# Patient Record
Sex: FEMALE | Race: WHITE | NOT HISPANIC OR LATINO | Employment: UNEMPLOYED | URBAN - NONMETROPOLITAN AREA
[De-identification: names, ages, dates, MRNs, and addresses within clinical notes are randomized per-mention and may not be internally consistent; named-entity substitution may affect disease eponyms.]

---
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
## 2022-01-14 ENCOUNTER — OFFICE VISIT (OUTPATIENT)
Dept: OBSTETRICS AND GYNECOLOGY | Facility: CLINIC | Age: 22
End: 2022-01-14

## 2022-01-14 VITALS
DIASTOLIC BLOOD PRESSURE: 60 MMHG | HEIGHT: 62 IN | WEIGHT: 115.8 LBS | SYSTOLIC BLOOD PRESSURE: 98 MMHG | HEART RATE: 91 BPM | BODY MASS INDEX: 21.31 KG/M2

## 2022-01-14 DIAGNOSIS — N89.8 LEUKORRHEA: Primary | ICD-10-CM

## 2022-01-14 DIAGNOSIS — N94.10 FEMALE DYSPAREUNIA: ICD-10-CM

## 2022-01-14 DIAGNOSIS — L98.9 SKIN LESION: ICD-10-CM

## 2022-01-14 PROCEDURE — 99214 OFFICE O/P EST MOD 30 MIN: CPT | Performed by: NURSE PRACTITIONER

## 2022-01-14 PROCEDURE — 17000 DESTRUCT PREMALG LESION: CPT | Performed by: NURSE PRACTITIONER

## 2022-01-14 PROCEDURE — 87210 SMEAR WET MOUNT SALINE/INK: CPT | Performed by: NURSE PRACTITIONER

## 2022-01-14 RX ORDER — ONDANSETRON 4 MG/1
4 TABLET, ORALLY DISINTEGRATING ORAL EVERY 8 HOURS PRN
Qty: 12 TABLET | Refills: 0 | Status: SHIPPED | OUTPATIENT
Start: 2022-01-14 | End: 2022-12-16

## 2022-01-14 RX ORDER — LEVOTHYROXINE SODIUM 0.03 MG/1
TABLET ORAL
COMMUNITY
Start: 2021-12-29 | End: 2022-12-16 | Stop reason: DRUGHIGH

## 2022-01-14 RX ORDER — DOXYCYCLINE 100 MG/1
100 CAPSULE ORAL 2 TIMES DAILY
Qty: 14 CAPSULE | Refills: 0 | Status: SHIPPED | OUTPATIENT
Start: 2022-01-14 | End: 2022-01-21

## 2022-01-14 RX ORDER — BUSPIRONE HYDROCHLORIDE 5 MG/1
TABLET ORAL
COMMUNITY
Start: 2021-12-20 | End: 2022-12-16

## 2022-01-14 RX ORDER — FLUCONAZOLE 150 MG/1
TABLET ORAL
Qty: 2 TABLET | Refills: 0 | Status: SHIPPED | OUTPATIENT
Start: 2022-01-14 | End: 2022-12-16

## 2022-01-14 NOTE — PROGRESS NOTES
Subjective   Una Dubois is a 21 y.o. female.     HPI   Pt presents with concerns about a possible skin tag or recurrent condyloma on the mons pubis. I have performed TCA on the area before with success. She noted this area a month ago but after shaving yesterday, notes it to be smaller today. Doesn't itch or burn. She also notes 2 smaller placed just adjacent to the previously treated area.     Pt also wanted to talk about pain with intercourse again. She previously mentioned some pelvic pain with sex but it had gotten better. Last visit she had leukorrhea and STI testing was negative. It appeared to be cervicitis. I gave her doxycycline in Oct. Pt completed that and said discharge and pain did improve significantly. Pain isn't as frequent or severe now. Burning is worse after sex too. Pt has chronic discharge but no odors or itching or burning (outside of intercourse related). Has still been with the same partner, who she states has never been with anyone else. Hasn't ever had a OneSwab.     The following portions of the patient's history were reviewed and updated as appropriate: allergies, current medications, past family history, past medical history, past social history, past surgical history and problem list.    Review of Systems   Constitutional: Negative.  Negative for chills and fever.   Respiratory: Negative.    Cardiovascular: Negative.    Genitourinary: Positive for dyspareunia and vaginal discharge (chronic). Negative for difficulty urinating, dysuria, flank pain, frequency, genital sores, menstrual problem, pelvic pain, urgency, vaginal bleeding and vaginal pain.   Skin: Positive for skin lesions (mons pubis).   Neurological: Negative for dizziness, syncope and light-headedness.       Objective   Physical Exam  Vitals reviewed. Exam conducted with a chaperone present.   Constitutional:       Appearance: Normal appearance.   Pulmonary:      Effort: Pulmonary effort is normal.   Genitourinary:      General: Normal vulva.      Labia:         Right: No rash, tenderness, lesion or injury.         Left: No rash, tenderness, lesion or injury.       Vagina: No signs of injury and foreign body. Vaginal discharge (copious pale yellow/white discharge, wet prep obtained ) and tenderness present. No erythema, bleeding, lesions or prolapsed vaginal walls.      Cervix: Friability and erythema present. No cervical motion tenderness, discharge, lesion, cervical bleeding or eversion.      Uterus: Normal. Tender: mild.       Adnexa: Right adnexa normal and left adnexa normal.        Right: No mass, tenderness or fullness.          Left: No mass, tenderness or fullness.            Comments: Mild scarring noted in the area of previous TCA application. There is a very pinpoint, barely raised, fleshy area where pt points out. No erythema, exudate, warmth, sore, etc noted. There is a slightly larger 3mm, fleshy pink skin tag noted in red above. Wet prep: positive for WBC TNTC, no clue cells, no yeast buds, hyphae or trichomonads. Evaluated by GERSON Ceron. Dyspareunia type pain was mildly reproduced on exam and burning present after exam again today.   Neurological:      Mental Status: She is alert and oriented to person, place, and time.   Psychiatric:         Mood and Affect: Mood normal.         Behavior: Behavior normal.           Assessment/Plan   Diagnoses and all orders for this visit:    1. Leukorrhea (Primary)  -     doxycycline (MONODOX) 100 MG capsule; Take 1 capsule by mouth 2 (Two) Times a Day for 7 days.  Dispense: 14 capsule; Refill: 0  -     fluconazole (DIFLUCAN) 150 MG tablet; Take 1 tablet PO on day 3 and day 7 of antibiotics  Dispense: 2 tablet; Refill: 0  -     ondansetron ODT (Zofran ODT) 4 MG disintegrating tablet; Place 1 tablet on the tongue Every 8 (Eight) Hours As Needed for Nausea or Vomiting.  Dispense: 12 tablet; Refill: 0    2. Female dyspareunia    3. Skin lesion         Discussed benign  findings on the skin. I would not recommend TCA and would monitor the areas on the mons pubis next to previous TCA treatment site. Pt really wants the larger one addressed. I explained it is benign appearance and extremely small. We can try TCA but it may not take to the area well. Pt voices understanding. I applied one application of TCA to the area. Pt tolerated well.  Pt encouraged not to shave to prevent skin irritation and skin tags.    Discussed findings of continued leukorrhea. She has had chronic discharge with WBC noted before and STI testing negative. I recommend a OneSwab to rule otu other potential infections. I will call with results and Rx PRN.     I recommend antibiotics in the menatime. Doxycycline 100mg BID x 7 days. Diflucan on day 3 and 7 to prevent secondary candida. Zofran to take PRN since she had some nausea on Doxycycline previously. No intercourse during treatment. Pt voices understanding and agreement with this plan.

## 2022-03-18 DIAGNOSIS — Z30.45 ENCOUNTER FOR SURVEILLANCE OF TRANSDERMAL PATCH HORMONAL CONTRACEPTIVE DEVICE: ICD-10-CM

## 2022-03-18 RX ORDER — NORELGESTROMIN AND ETHINLY ESTRADIOL 150; 35 UG/D; UG/D
PATCH TRANSDERMAL
Qty: 3 PATCH | Refills: 12 | Status: SHIPPED | OUTPATIENT
Start: 2022-03-18 | End: 2023-03-20

## 2022-04-27 ENCOUNTER — OFFICE VISIT (OUTPATIENT)
Dept: OBSTETRICS AND GYNECOLOGY | Facility: CLINIC | Age: 22
End: 2022-04-27

## 2022-04-27 VITALS
BODY MASS INDEX: 22.36 KG/M2 | HEIGHT: 61 IN | SYSTOLIC BLOOD PRESSURE: 92 MMHG | WEIGHT: 118.4 LBS | HEART RATE: 82 BPM | DIASTOLIC BLOOD PRESSURE: 62 MMHG

## 2022-04-27 DIAGNOSIS — A63.0 CONDYLOMA OF FEMALE GENITALIA: Primary | ICD-10-CM

## 2022-04-27 PROCEDURE — 17000 DESTRUCT PREMALG LESION: CPT | Performed by: NURSE PRACTITIONER

## 2022-04-27 PROCEDURE — 99213 OFFICE O/P EST LOW 20 MIN: CPT | Performed by: NURSE PRACTITIONER

## 2022-04-27 RX ORDER — CETIRIZINE HYDROCHLORIDE 10 MG/1
10 TABLET ORAL DAILY
COMMUNITY
Start: 2022-04-21 | End: 2022-12-16

## 2022-04-27 NOTE — PROGRESS NOTES
"Subjective   Una Dubois is a 21 y.o. female.     History of Present Illness   Pt presents with concerns of another \"skin tag\" or condyloma on the left buttock/inner labia majora. Present for a few months and increasing in size. She has had other areas treated with TCA before. Some appear like skin tags and others have appeared more as condyloma. Doesn't itch or hurt.     The following portions of the patient's history were reviewed and updated as appropriate: allergies, current medications, past family history, past medical history, past social history, past surgical history and problem list.    Review of Systems   Constitutional: Negative.    Genitourinary: Positive for genital sores (\"skin tag\" on left inner labia majora). Negative for vaginal pain.       Objective   Physical Exam  Vitals reviewed. Exam conducted with a chaperone present.   Constitutional:       Appearance: Normal appearance.   Genitourinary:     Labia:         Left: Lesion present.           Comments: TCA applied to the lesion. It responded appropriated. Pt tolerated well.   Neurological:      Mental Status: She is alert and oriented to person, place, and time.           Assessment/Plan   Diagnoses and all orders for this visit:    1. Condyloma of female genitalia (Primary)      Pt wished to move forward with TCA treatment today. Gave verbal consent.    Pt tolerated well.     Avoid scratching or scrubbing the area. Pat clean and dry. RTC if lesion persists 2 weeks after treatment.     Pt voices understanding and agreement with this plan of care.          "

## 2022-12-16 ENCOUNTER — OFFICE VISIT (OUTPATIENT)
Dept: OBSTETRICS AND GYNECOLOGY | Facility: CLINIC | Age: 22
End: 2022-12-16

## 2022-12-16 VITALS
HEART RATE: 81 BPM | SYSTOLIC BLOOD PRESSURE: 92 MMHG | HEIGHT: 62 IN | WEIGHT: 123.4 LBS | BODY MASS INDEX: 22.71 KG/M2 | DIASTOLIC BLOOD PRESSURE: 58 MMHG

## 2022-12-16 DIAGNOSIS — N89.8 VAGINAL LEUKORRHEA: ICD-10-CM

## 2022-12-16 DIAGNOSIS — R10.2 CHRONIC PELVIC PAIN IN FEMALE: ICD-10-CM

## 2022-12-16 DIAGNOSIS — G89.29 CHRONIC PELVIC PAIN IN FEMALE: ICD-10-CM

## 2022-12-16 DIAGNOSIS — A63.0 CONDYLOMA: ICD-10-CM

## 2022-12-16 DIAGNOSIS — N94.10 DYSPAREUNIA IN FEMALE: Primary | ICD-10-CM

## 2022-12-16 PROCEDURE — 87210 SMEAR WET MOUNT SALINE/INK: CPT | Performed by: NURSE PRACTITIONER

## 2022-12-16 PROCEDURE — 99214 OFFICE O/P EST MOD 30 MIN: CPT | Performed by: NURSE PRACTITIONER

## 2022-12-16 RX ORDER — DOXYCYCLINE 100 MG/1
100 CAPSULE ORAL 2 TIMES DAILY
Qty: 14 CAPSULE | Refills: 0 | Status: SHIPPED | OUTPATIENT
Start: 2022-12-16 | End: 2022-12-23

## 2022-12-16 RX ORDER — LEVOTHYROXINE SODIUM 0.07 MG/1
TABLET ORAL
COMMUNITY
Start: 2022-12-05

## 2022-12-16 RX ORDER — FLUCONAZOLE 150 MG/1
TABLET ORAL
Qty: 2 TABLET | Refills: 0 | Status: SHIPPED | OUTPATIENT
Start: 2022-12-16 | End: 2023-01-19

## 2022-12-16 RX ORDER — LEVETIRACETAM 1000 MG/1
TABLET ORAL
COMMUNITY
Start: 2022-11-01

## 2022-12-16 RX ORDER — FLUCONAZOLE 150 MG/1
TABLET ORAL
Qty: 2 TABLET | Refills: 0 | Status: SHIPPED | OUTPATIENT
Start: 2022-12-16 | End: 2022-12-16 | Stop reason: SDUPTHER

## 2022-12-16 RX ORDER — FOLIC ACID 1 MG/1
TABLET ORAL
COMMUNITY
Start: 2022-11-01

## 2022-12-16 RX ORDER — DOXYCYCLINE 100 MG/1
100 CAPSULE ORAL 2 TIMES DAILY
Qty: 14 CAPSULE | Refills: 0 | Status: SHIPPED | OUTPATIENT
Start: 2022-12-16 | End: 2022-12-16 | Stop reason: SDUPTHER

## 2022-12-19 NOTE — PROGRESS NOTES
"Subjective   Una Dubois is a 22 y.o. female.     HPI   Pt presents with concerns of another condyloma on the mons pubis area. Present for a few months and increasing in size. She has had other areas treated with TCA before. Some appear like skin tags and others have appeared more as condyloma. Doesn't itch or hurt.     Pt also continues to complain of dyspareunia. Persisting over the last few years but seems worse at certain times. Typically deep in the pelvis but recently noticed \"vaginal wall\" pain with sex. Same partner for a few years. Patient's last menstrual period was 12/02/2022 (approximate). Is on Xulane patches for contraceptive. She inquires about endometriosis. No family hx of it. But does have painful bowel movements, intercourse, and back pain. She has chronic vaginal discharge. OneSwabs have been negative, no change in partners or concerns for STI. Last treated with doxycycline 100mg BID x 7days in Jan 2022 for leukorrhea prior to negative OneSwab.     The following portions of the patient's history were reviewed and updated as appropriate: allergies, current medications, past family history, past medical history, past social history, past surgical history and problem list.    Review of Systems   Constitutional: Negative.  Negative for chills and fever.   Respiratory: Negative.    Cardiovascular: Positive for palpitations.        Being evaluated by cardio   Gastrointestinal:        Painful bowel movements   Endocrine:        Hypothyroidism   Genitourinary: Positive for dyspareunia, genital sores (condyloma on the mons pubis), pelvic pain (during and outside of her period) and vaginal discharge (chronic). Negative for difficulty urinating, dysuria, flank pain, frequency, hematuria, menstrual problem, pelvic pressure, urgency, vaginal bleeding and vaginal pain.   Musculoskeletal: Positive for back pain.   Neurological: Negative for dizziness, syncope and light-headedness.       Objective   Physical " Exam  Vitals reviewed. Exam conducted with a chaperone present.   Constitutional:       General: She is not in acute distress.     Appearance: Normal appearance. She is normal weight. She is not ill-appearing.   Pulmonary:      Effort: Pulmonary effort is normal.   Genitourinary:     Labia:         Right: No rash, tenderness, lesion or injury.         Left: No rash, tenderness, lesion or injury.       Vagina: No signs of injury and foreign body. Vaginal discharge (small amount of creamy/yellow discharge, wet prep obtained) present. No erythema, tenderness, bleeding or lesions.      Cervix: No cervical motion tenderness, discharge, friability, lesion, erythema or cervical bleeding.      Uterus: Normal. Not enlarged and not tender.       Adnexa: Right adnexa normal and left adnexa normal.        Right: No mass, tenderness or fullness.          Left: No mass, tenderness or fullness.            Comments: TCA applied to the lesion. It responded appropriated. Pt tolerated well. Mild ectopy of cervix. Wet prep: positive clue WBC TNTC, no clue cells, yeast buds, hyphae or trichomonads. Evaluated by GERSON Ceron.   Neurological:      Mental Status: She is alert and oriented to person, place, and time.   Psychiatric:         Mood and Affect: Mood normal.         Behavior: Behavior normal.           Assessment & Plan   Diagnoses and all orders for this visit:    1. Dyspareunia in female (Primary)  -     US Non-ob Transvaginal; Future    2. Chronic pelvic pain in female    3. Condyloma    4. Vaginal leukorrhea  -     doxycycline (MONODOX) 100 MG capsule; Take 1 capsule by mouth 2 (Two) Times a Day for 7 days.  Dispense: 14 capsule; Refill: 0  -     fluconazole (DIFLUCAN) 150 MG tablet; Take 1 tablet PO on day 3 and day 7 of antibiotics.  Dispense: 2 tablet; Refill: 0    Other orders  -     Discontinue: doxycycline (MONODOX) 100 MG capsule; Take 1 capsule by mouth 2 (Two) Times a Day for 7 days.  Dispense: 14 capsule;  Refill: 0  -     Discontinue: fluconazole (DIFLUCAN) 150 MG tablet; Take 1 tablet PO on day 3 and day 7 of antibiotics.  Dispense: 2 tablet; Refill: 0      Pt wished to move forward with TCA treatment today. Gave verbal consent.    Pt tolerated well.     Avoid scratching or scrubbing the area. Pat clean and dry. RTC if lesion persists 2 weeks after treatment.     Pt voices understanding and agreement with this plan of care.     I discussed her chronic pelvic pain and dyspareunia and discussed endometriosis diagnosis at length. Last pelvic US was 1.5 years ago. We will obtain TVUS for further assessment. I discussed that if it is negative, that doesn't rule out endometriosis. Pt voices understanding. We may can consider myfembree, if approved, but pt will need to use condoms consistently as birth control. Pt voices understanding. She does not want to have diagnostic lap unless absolutely necessary.     Discussed leukorrhea findings. They did previously improve with doxycycline 1 year ago. We will retreat and if persisting or recurring, I recommend repeating OneSwab again. Pt voices understanding.     I will call with TVUS results and discuss next steps.

## 2023-02-08 PROCEDURE — 87081 CULTURE SCREEN ONLY: CPT | Performed by: FAMILY MEDICINE

## 2023-02-08 PROCEDURE — 87636 SARSCOV2 & INF A&B AMP PRB: CPT | Performed by: FAMILY MEDICINE

## 2023-02-16 ENCOUNTER — HOSPITAL ENCOUNTER (OUTPATIENT)
Dept: ULTRASOUND IMAGING | Facility: HOSPITAL | Age: 23
Discharge: HOME | End: 2023-02-16
Admitting: NURSE PRACTITIONER
Payer: COMMERCIAL

## 2023-02-16 DIAGNOSIS — N94.10 DYSPAREUNIA IN FEMALE: ICD-10-CM

## 2023-02-16 PROCEDURE — 76830 TRANSVAGINAL US NON-OB: CPT

## 2023-03-16 ENCOUNTER — OFFICE VISIT (OUTPATIENT)
Dept: INTERNAL MEDICINE | Facility: CLINIC | Age: 23
End: 2023-03-16
Payer: COMMERCIAL

## 2023-03-16 VITALS
BODY MASS INDEX: 26.61 KG/M2 | HEART RATE: 109 BPM | OXYGEN SATURATION: 99 % | WEIGHT: 132 LBS | HEIGHT: 59 IN | DIASTOLIC BLOOD PRESSURE: 76 MMHG | TEMPERATURE: 97.1 F | SYSTOLIC BLOOD PRESSURE: 124 MMHG

## 2023-03-16 DIAGNOSIS — E55.9 VITAMIN D DEFICIENCY: Primary | ICD-10-CM

## 2023-03-16 DIAGNOSIS — R53.83 FATIGUE, UNSPECIFIED TYPE: ICD-10-CM

## 2023-03-16 DIAGNOSIS — G40.909 NONINTRACTABLE EPILEPSY WITHOUT STATUS EPILEPTICUS, UNSPECIFIED EPILEPSY TYPE: ICD-10-CM

## 2023-03-16 DIAGNOSIS — E03.9 ACQUIRED HYPOTHYROIDISM: ICD-10-CM

## 2023-03-16 DIAGNOSIS — E53.8 FOLATE DEFICIENCY: ICD-10-CM

## 2023-03-16 DIAGNOSIS — Z00.00 WELLNESS EXAMINATION: ICD-10-CM

## 2023-03-16 PROCEDURE — 99214 OFFICE O/P EST MOD 30 MIN: CPT | Performed by: NURSE PRACTITIONER

## 2023-03-16 PROCEDURE — 1159F MED LIST DOCD IN RCRD: CPT | Performed by: NURSE PRACTITIONER

## 2023-03-16 PROCEDURE — 1160F RVW MEDS BY RX/DR IN RCRD: CPT | Performed by: NURSE PRACTITIONER

## 2023-03-16 RX ORDER — METOPROLOL SUCCINATE 25 MG/1
25 TABLET, EXTENDED RELEASE ORAL DAILY
Qty: 30 TABLET | Refills: 11 | Status: SHIPPED | OUTPATIENT
Start: 2023-03-16

## 2023-03-17 LAB
25(OH)D3+25(OH)D2 SERPL-MCNC: 25.1 NG/ML (ref 30–100)
ALBUMIN SERPL-MCNC: 4.4 G/DL (ref 3.5–5.2)
ALBUMIN/GLOB SERPL: 1.5 G/DL
ALP SERPL-CCNC: 37 U/L (ref 39–117)
ALT SERPL-CCNC: 6 U/L (ref 1–33)
AST SERPL-CCNC: 15 U/L (ref 1–32)
BASOPHILS # BLD AUTO: 0.08 10*3/MM3 (ref 0–0.2)
BASOPHILS NFR BLD AUTO: 1.2 % (ref 0–1.5)
BILIRUB SERPL-MCNC: 0.4 MG/DL (ref 0–1.2)
BUN SERPL-MCNC: 12 MG/DL (ref 6–20)
BUN/CREAT SERPL: 16.2 (ref 7–25)
CALCIUM SERPL-MCNC: 9.6 MG/DL (ref 8.6–10.5)
CHLORIDE SERPL-SCNC: 103 MMOL/L (ref 98–107)
CO2 SERPL-SCNC: 21.5 MMOL/L (ref 22–29)
CREAT SERPL-MCNC: 0.74 MG/DL (ref 0.57–1)
EGFRCR SERPLBLD CKD-EPI 2021: 119 ML/MIN/1.73
EOSINOPHIL # BLD AUTO: 0.19 10*3/MM3 (ref 0–0.4)
EOSINOPHIL NFR BLD AUTO: 2.8 % (ref 0.3–6.2)
ERYTHROCYTE [DISTWIDTH] IN BLOOD BY AUTOMATED COUNT: 12.1 % (ref 12.3–15.4)
FOLATE SERPL-MCNC: >20 NG/ML (ref 4.78–24.2)
GLOBULIN SER CALC-MCNC: 2.9 GM/DL
GLUCOSE SERPL-MCNC: 126 MG/DL (ref 65–99)
HCT VFR BLD AUTO: 37 % (ref 34–46.6)
HGB BLD-MCNC: 12.7 G/DL (ref 12–15.9)
IMM GRANULOCYTES # BLD AUTO: 0.02 10*3/MM3 (ref 0–0.05)
IMM GRANULOCYTES NFR BLD AUTO: 0.3 % (ref 0–0.5)
IRON SATN MFR SERPL: 39 % (ref 20–50)
IRON SERPL-MCNC: 221 MCG/DL (ref 37–145)
LYMPHOCYTES # BLD AUTO: 2.14 10*3/MM3 (ref 0.7–3.1)
LYMPHOCYTES NFR BLD AUTO: 31.1 % (ref 19.6–45.3)
MCH RBC QN AUTO: 30.9 PG (ref 26.6–33)
MCHC RBC AUTO-ENTMCNC: 34.3 G/DL (ref 31.5–35.7)
MCV RBC AUTO: 90 FL (ref 79–97)
MONOCYTES # BLD AUTO: 0.3 10*3/MM3 (ref 0.1–0.9)
MONOCYTES NFR BLD AUTO: 4.4 % (ref 5–12)
NEUTROPHILS # BLD AUTO: 4.15 10*3/MM3 (ref 1.7–7)
NEUTROPHILS NFR BLD AUTO: 60.2 % (ref 42.7–76)
NRBC BLD AUTO-RTO: 0 /100 WBC (ref 0–0.2)
PLATELET # BLD AUTO: 284 10*3/MM3 (ref 140–450)
POTASSIUM SERPL-SCNC: 4.1 MMOL/L (ref 3.5–5.2)
PROT SERPL-MCNC: 7.3 G/DL (ref 6–8.5)
RBC # BLD AUTO: 4.11 10*6/MM3 (ref 3.77–5.28)
SODIUM SERPL-SCNC: 137 MMOL/L (ref 136–145)
TIBC SERPL-MCNC: 574 MCG/DL
TSH SERPL DL<=0.005 MIU/L-ACNC: 1.52 UIU/ML (ref 0.27–4.2)
UIBC SERPL-MCNC: 353 MCG/DL (ref 112–346)
VIT B12 SERPL-MCNC: 227 PG/ML (ref 211–946)
WBC # BLD AUTO: 6.88 10*3/MM3 (ref 3.4–10.8)

## 2023-03-17 NOTE — PROGRESS NOTES
"        Subjective     Chief Complaint:  Depression    HPI:  Patient presents to the office today to establish care.  She has recently moved to this area from [facility].  She states that she was seeing a neurologist for epilepsy, Dr. Good.  She is currently maintained on Keppra.  She is unsure when she had her last seizure.  She did have one witnessed generalized tonic-clonic seizure occurring thousand 19 but has not had one since that she is aware of.  She states that the neurologist told her they could not rule out absence seizures as she does \"doze off\" a lot.    The patient states she was also seeing a cardiologist prior to moving as if she had some elevated heart rates on a Holter monitor.  She was also having episodes of dizziness and lightheadedness and near syncope without actually losing consciousness.  She had been having palpitations, which seem to be better now that she is taking Toprol-XL.    Patient was also seeing an endocrinologist for her thyroid.  She states she was never told if her thyroid was over or underactive but did have a biopsy of a nodule.    Patient with history of depression, currently without taking medication.  She states that she had GeneSight testing performed greater than 5 years ago which states she was \"allergic\" to all medications except Wellbutrin.  When she took Wellbutrin, it was believed that this medication gave her a seizure so she has not been medicated since.  She has had suicidal thoughts in the past but currently denies any suicidal or homicidal ideation.  No active plan for suicide.  She has spoken with a counselor/therapist in the past.    Patient reports using birth control patch for contraception.  She does see a gynecologist who thought she may possibly have endometriosis.    Patient's PMR from outside medical facility reviewed and noted.    Past Medical History:   Past Medical History:   Diagnosis Date   • Acute bronchitis    • Anxiety    • Depression  " "  • Epilepsy (HCC)    • H. pylori infection      Past Surgical History:No past surgical history on file.  Social History:  reports that she has never smoked. She has never used smokeless tobacco. She reports that she does not drink alcohol and does not use drugs.    Family History: family history is not on file.      Allergies:  Allergies   Allergen Reactions   • Bupropion Other (See Comments)     Had a seizure after taking   • Prednisone Other (See Comments)     Blush redness to face and very warm   • Zoloft [Sertraline Hcl] Other (See Comments)     Emotional       Medications:  Prior to Admission medications    Medication Sig Start Date End Date Taking? Authorizing Provider   albuterol sulfate  (90 Base) MCG/ACT inhaler As Needed. 9/17/20  Yes Denise Mckenzie MD   folic acid (FOLVITE) 1 MG tablet TAKE 1 TABLET (1 MG TOTAL) BY MOUTH IN THE MORNING. 11/1/22  Yes Denise Mckenzie MD   levETIRAcetam (KEPPRA) 1000 MG tablet TAKE 1 TABLET (1,000 MG TOTAL) BY MOUTH IN THE MORNING AND 1 TABLET (1,000 MG TOTAL) BEFORE BEDTIME. 11/1/22  Yes Denise Mckenzie MD   levothyroxine (SYNTHROID, LEVOTHROID) 75 MCG tablet TAKE 1 TABLET (75 MCG TOTAL) BY MOUTH DAILY. 12/5/22  Yes Denise Mckenzie MD   metoprolol succinate XL (TOPROL-XL) 25 MG 24 hr tablet Take 1 tablet by mouth Daily. 3/16/23  Yes Kandace Couch APRN   montelukast (SINGULAIR) 10 MG tablet Take 1 tablet by mouth Every Night. 9/3/21  Yes Denise Mckenzie MD   Zafedejah 150-35 MCG/24HR APPLY 1 PATCH TO SKIN ONCE A WEEK AND LEAVE OFF PATCH EVERY 4TH WEEK 3/18/22  Yes Anitra Alexander APRN       Objective     Vital Signs: /76 (BP Location: Left arm, Patient Position: Sitting, Cuff Size: Adult)   Pulse 109   Temp 97.1 °F (36.2 °C) (Temporal)   Ht 149.9 cm (59\")   Wt 59.9 kg (132 lb)   LMP 02/20/2023   SpO2 99%   Breastfeeding No   BMI 26.66 kg/m²   Physical Exam  Vitals and nursing note reviewed. "   Constitutional:       General: She is not in acute distress.     Appearance: She is not ill-appearing or toxic-appearing.   HENT:      Head: Normocephalic and atraumatic.      Mouth/Throat:      Mouth: Mucous membranes are moist.      Pharynx: Oropharynx is clear.   Cardiovascular:      Rate and Rhythm: Normal rate and regular rhythm.      Pulses: Normal pulses.      Heart sounds: Normal heart sounds.   Pulmonary:      Effort: Pulmonary effort is normal.      Breath sounds: No wheezing, rhonchi or rales.   Abdominal:      General: Bowel sounds are normal. There is no distension.      Palpations: Abdomen is soft.      Tenderness: There is no abdominal tenderness.   Musculoskeletal:         General: No swelling or tenderness. Normal range of motion.      Cervical back: Normal range of motion and neck supple. No tenderness.   Skin:     General: Skin is warm and dry.      Coloration: Skin is pale.      Findings: No erythema or rash.   Neurological:      General: No focal deficit present.      Mental Status: She is alert and oriented to person, place, and time.   Psychiatric:         Mood and Affect: Mood normal.         Behavior: Behavior normal.         Thought Content: Thought content normal.         Judgment: Judgment normal.       BMI is >= 25 and <30. (Overweight) The following options were offered after discussion;: exercise counseling/recommendations and nutrition counseling/recommendations    Results Reviewed:  Reviewed a previous office visit with cardiology from 12/8/2022.  Reviewed previous office visit note with neurology from 5/11/2022.  Reviewed a previous office visit note with gynecology from 12/16/2022.  Reviewed endocrinology office visit note from 9/20/2022 and 9/30/2022.  Reviewed previous office visit notes from the patient's PCP from 11/8/2022 and 7/20/2022.    Assessment / Plan     Assessment/Plan:  Diagnoses and all orders for this visit:    1. Vitamin D deficiency (Primary)  -     Vitamin  D,25-Hydroxy    2. Fatigue, unspecified type  -     Vitamin B12  -     Iron Profile    3. Folate deficiency  -     Folate    4. Acquired hypothyroidism  -     TSH Rfx On Abnormal To Free T4    5. Wellness examination  -     CBC & Differential  -     Comprehensive metabolic panel    6. History of epilepsy    Other orders  -     metoprolol succinate XL (TOPROL-XL) 25 MG 24 hr tablet; Take 1 tablet by mouth Daily.  Dispense: 30 tablet; Refill: 11       Patient presents to the office today to establish care.  She has recently moved to this area, was previously receiving care through primary care and specialists in the [place] and [facility].  We will check baseline labs today as above.  The patient does take a folic acid vitamin for reported previous folate deficiency.  She states that her previous PCP has been checking vitamin B12 and iron levels as well.  She has a previous documented vitamin D deficiency but does not believe that she was ever treated for this with supplement.  Will reassess.    Reviewed patient's neurology office visit notes from her previous neurologist in [place].  She has a prior history of a single witnessed generalized tonic-clonic seizure occurring in 2019.  She is currently maintained on Keppra 1000 mg and is compliant with this.  She denies any recent seizure activity, but states that her neurologist was not unable to rule out absence seizures.  As the patient has now moved to this area we will refer to neurology in our area for continued monitoring of epilepsy.    The patient was seeing a cardiologist for palpitations.  She had had a Holter monitor per her previous PCP.  This was reportedly in July 2022 which showed no critical or serious arrhythmias.  Minimum heart rate 48, average heart rate 74, max heart rate 125 rate controlled 88% of the time.  Patient is taking Toprol-XL which she feels has helped with palpitations since starting. It appears that cardiology had been  "planning to schedule an echocardiogram and exercise stress test for the patient as there was concern that she may have POTS due to episodes of dizziness/lightheadedness and near syncope.  The patient has not had any syncopal episodes.  Cardiology had attempted to start the patient on midodrine but the patient felt as though she was having allergic reaction to this so did not take this for very long.  Patient is reportedly intolerant to steroids, namely prednisone.  We will continue to monitor patient's symptoms closely and if she continues to have periods of dizziness and lightheadedness we may consider above testing that was going to be performed by cardiology.  Does appear that her symptoms have improved since starting Toprol-XL.    The patient had been seeing endocrinology following a thyroid ultrasound which showed a multinodular goiter.  At a visit in September 2022 the patient felt that her right thyroid lobe was getting larger and she was feeling very generally tired.  Her levothyroxine was increased to 75 mcg daily-labs at that time showed TSH of 2.94, free T4 of 0.68 and free T3 of 3.5.  An ultrasound-guided thyroid fine-needle aspiration with biopsy was performed on 9/30/2022. Results were benign.    The patient does see a gynecologist and uses birth control patch for contraception.  She is unsure if she has ever had a Pap smear.  The patient wishes to continue following with her current gynecologist and does not wish to be referred to gynecologist in this area as of yet.    The patient does have a history of depression and anxiety, more so depression.  She currently does not take any medication for this.  She states she did have a GeneSight test many years ago, possibly greater than 5 years ago.  She states the only medication that she was not \"allergic\" to was Wellbutrin and when she took that it made her possibly have a seizure.  As it has been several years since her last GeneSight test we may be able " to repeat this.  Patient was given information on this due to concerns with cost.  If she would like to repeat test we can do that in the office at any time.  The patient states that she has had suicidal thoughts in the past but has no active plan at this time and has never attempted suicide.  She denies any homicidal ideation.  We discussed that it may be beneficial for her to speak to a therapist or counselor and she will consider this.  She denies any drug or alcohol use.    Return in about 3 months (around 6/16/2023) for Recheck. unless patient needs to be seen sooner or acute issues arise.      I have discussed the patient results/orders and and plan/recommendation with them at today's visit.      Kandace Couch, APRN   03/16/2023

## 2023-03-19 DIAGNOSIS — Z30.45 ENCOUNTER FOR SURVEILLANCE OF TRANSDERMAL PATCH HORMONAL CONTRACEPTIVE DEVICE: ICD-10-CM

## 2023-03-20 RX ORDER — NORELGESTROMIN AND ETHINLY ESTRADIOL 150; 35 UG/D; UG/D
PATCH TRANSDERMAL
Qty: 3 PATCH | Refills: 12 | Status: SHIPPED | OUTPATIENT
Start: 2023-03-20

## 2023-03-20 RX ORDER — ERGOCALCIFEROL 1.25 MG/1
50000 CAPSULE ORAL WEEKLY
Qty: 12 CAPSULE | Refills: 1 | Status: SHIPPED | OUTPATIENT
Start: 2023-03-20

## 2023-03-20 NOTE — PROGRESS NOTES
Kidney function, electrolytes look good.  Patient is not anemic.  Iron levels are actually high, is she taking an iron supplement?  Thyroid function testing looks good.  Vitamin D level is low, will prescribe a weekly supplement and we can recheck this level at her next office visit.  Vitamin B12 level is normal but is on the lower end of normal so she can either take an over-the-counter B12 supplement or we can give her injections once monthly, which ever she would prefer.

## 2023-03-21 ENCOUNTER — TELEPHONE (OUTPATIENT)
Dept: INTERNAL MEDICINE | Facility: CLINIC | Age: 23
End: 2023-03-21

## 2023-03-21 NOTE — TELEPHONE ENCOUNTER
Caller: Una Dubois    Relationship: Self    Best call back number: [phone]    Caller requesting test results: SELF    What test was performed: LABS     When was the test performed: LAST WEEK SOMETIME     Where was the test performed:  IN OFFICE

## 2023-04-19 ENCOUNTER — CLINICAL SUPPORT (OUTPATIENT)
Dept: INTERNAL MEDICINE | Facility: CLINIC | Age: 23
End: 2023-04-19
Payer: COMMERCIAL

## 2023-04-19 DIAGNOSIS — E53.8 B12 DEFICIENCY: Primary | ICD-10-CM

## 2023-04-19 RX ORDER — CYANOCOBALAMIN 1000 UG/ML
1000 INJECTION, SOLUTION INTRAMUSCULAR; SUBCUTANEOUS
Status: SHIPPED | OUTPATIENT
Start: 2023-04-19

## 2023-04-19 RX ADMIN — CYANOCOBALAMIN 1000 MCG: 1000 INJECTION, SOLUTION INTRAMUSCULAR; SUBCUTANEOUS at 13:40

## 2023-04-24 RX ORDER — LEVOTHYROXINE SODIUM 0.07 MG/1
75 TABLET ORAL
Qty: 90 TABLET | Refills: 3 | Status: SHIPPED | OUTPATIENT
Start: 2023-04-24

## 2023-04-24 NOTE — TELEPHONE ENCOUNTER
Caller: Una Dubois    Relationship: Self    Best call back number: [phone]    Requested Prescriptions:   Requested Prescriptions     Pending Prescriptions Disp Refills   • levothyroxine (SYNTHROID, LEVOTHROID) 75 MCG tablet          Pharmacy where request should be sent: [id number] [place] - [address] [facility] [phone] [facility] [phone]      Last office visit with prescribing clinician: 3/16/2023   Last telemedicine visit with prescribing clinician: 6/15/2023   Next office visit with prescribing clinician: 6/15/2023     Additional details provided by patient: PATIENT IS COMPLETELY OUT OF MEDICATION    Does the patient have less than a 3 day supply:  [x] Yes  [] No    Would you like a call back once the refill request has been completed: [x] Yes [] No    If the office needs to give you a call back, can they leave a voicemail: [x] Yes [] No    Jose Laureano Rep   04/24/23 08:52 CDT

## 2023-04-25 PROCEDURE — 87635 SARS-COV-2 COVID-19 AMP PRB: CPT | Performed by: NURSE PRACTITIONER

## 2023-05-22 RX ORDER — FOLIC ACID 1 MG/1
1 TABLET ORAL DAILY
Qty: 30 TABLET | Refills: 11 | Status: SHIPPED | OUTPATIENT
Start: 2023-05-22

## 2023-05-22 RX ORDER — METOPROLOL SUCCINATE 25 MG/1
25 TABLET, EXTENDED RELEASE ORAL DAILY
Qty: 30 TABLET | Refills: 11 | Status: SHIPPED | OUTPATIENT
Start: 2023-05-22

## 2023-05-22 NOTE — TELEPHONE ENCOUNTER
Caller: Una Dubois    Relationship: Self    Best call back number: [phone]    Requested Prescriptions:   Requested Prescriptions     Pending Prescriptions Disp Refills   • metoprolol succinate XL (TOPROL-XL) 25 MG 24 hr tablet 30 tablet 11     Sig: Take 1 tablet by mouth Daily.   • folic acid (FOLVITE) 1 MG tablet        levothyroxine (SYNTHROID,   Pharmacy where request should be sent: [address] - [phone] [facility] [phone] [facility]     Last office visit with prescribing clinician: 3/16/2023   Last telemedicine visit with prescribing clinician: 4/24/2023   Next office visit with prescribing clinician: 6/15/2023     Additional details provided by patient:     Does the patient have less than a 3 day supply:  [x] Yes  [] No    Would you like a call back once the refill request has been completed: [] Yes [x] No    If the office needs to give you a call back, can they leave a voicemail: [] Yes [x] No    Jose Guidry Rep   05/22/23 12:47 CDT

## 2023-09-05 ENCOUNTER — OFFICE VISIT (OUTPATIENT)
Dept: OBSTETRICS AND GYNECOLOGY | Facility: CLINIC | Age: 23
End: 2023-09-05
Payer: COMMERCIAL

## 2023-09-05 VITALS
BODY MASS INDEX: 25.8 KG/M2 | HEIGHT: 59 IN | DIASTOLIC BLOOD PRESSURE: 62 MMHG | WEIGHT: 128 LBS | HEART RATE: 89 BPM | SYSTOLIC BLOOD PRESSURE: 100 MMHG

## 2023-09-05 DIAGNOSIS — N94.10 DYSPAREUNIA IN FEMALE: ICD-10-CM

## 2023-09-05 DIAGNOSIS — A63.0 CONDYLOMA: Primary | ICD-10-CM

## 2023-09-05 DIAGNOSIS — G89.29 CHRONIC PELVIC PAIN IN FEMALE: ICD-10-CM

## 2023-09-05 DIAGNOSIS — R10.2 CHRONIC PELVIC PAIN IN FEMALE: ICD-10-CM

## 2023-09-05 PROCEDURE — 1159F MED LIST DOCD IN RCRD: CPT | Performed by: NURSE PRACTITIONER

## 2023-09-05 PROCEDURE — 99214 OFFICE O/P EST MOD 30 MIN: CPT | Performed by: NURSE PRACTITIONER

## 2023-09-05 PROCEDURE — 1160F RVW MEDS BY RX/DR IN RCRD: CPT | Performed by: NURSE PRACTITIONER

## 2023-09-06 NOTE — PROGRESS NOTES
Subjective   Una Dubois is a 22 y.o. female.     History of Present Illness   Pt presents for concerns of recurrence of area on the mons pubis again. She has hx of a genital wart in the area but then small areas have recurred since that type is uncertain but she worries and requests TCA treatment as soon as they appear. Last treatment was 8 months ago.     She brings up continued painful intercourse and some bleeding with intercourse. She is currently menstruating limiting exam. This has been a chronic problem for a few years. US was normal in February. We previously discussed the potential for endometriosis. She is on Xulane patches now but has pain throughout the month. Painful bowel movements, LBP, dyspareunia, and painful periods. We discussed possibly trying Myfembree but pt would need to use condoms as this is not a contraceptive. Pt is unsure if she wants to try it and wants to look into it and let me know.     The following portions of the patient's history were reviewed and updated as appropriate: allergies, current medications, past family history, past medical history, past social history, past surgical history, and problem list.    Review of Systems   Constitutional: Negative.  Negative for chills and fever.   Respiratory: Negative.     Cardiovascular: Negative.    Gastrointestinal:         Painful bowel movements   Endocrine:        Hypothyroidism   Genitourinary:  Positive for dyspareunia, genital sores (condyloma on the mons pubis), pelvic pain (during and outside of her period) and vaginal bleeding (currently menstruating). Negative for difficulty urinating, dysuria, flank pain, frequency, hematuria, menstrual problem, pelvic pressure, urgency, vaginal discharge and vaginal pain.   Musculoskeletal:  Positive for back pain.   Neurological:  Negative for dizziness, syncope and light-headedness.     Objective   Physical Exam  Vitals reviewed. Exam conducted with a chaperone present.    Constitutional:       General: She is not in acute distress.     Appearance: Normal appearance. She is normal weight. She is not ill-appearing.   Cardiovascular:      Rate and Rhythm: Normal rate.   Pulmonary:      Effort: Pulmonary effort is normal.   Genitourinary:     Labia:         Right: No rash, tenderness, lesion or injury.         Left: No rash, tenderness, lesion or injury.       Vagina: No signs of injury and foreign body. Bleeding present. No erythema, tenderness or lesions.          Comments: TCA applied to the lesions. They responded appropriately. Pt tolerated well.   Neurological:      Mental Status: She is alert and oriented to person, place, and time.   Psychiatric:         Mood and Affect: Mood normal.         Behavior: Behavior normal.     Narrative & Impression   EXAMINATION: US NON-OB TRANSVAGINAL-     2/16/2023 3:47 PM CST     HISTORY: pelvic pain, dyspareunia; N94.10-Unspecified dyspareunia     Transvaginal grayscale, color-flow, and Doppler ultrasound evaluation.     Uterus = 52 x 34 x 37 mm.     Endometrium = 4 mm.     Normal and symmetric ovaries.  No cyst or mass.  No free fluid.     Right ovary = 29 x 15 x 17 mm.  Left ovary = 24 x 10 x 15 mm.     Appropriate Doppler waveforms within each ovary rule out ovarian  infarction.     Summary:  1. No abnormality is seen.  This report was finalized on 02/16/2023 16:22 by Dr. Morgan Rose MD.       Assessment & Plan   Diagnoses and all orders for this visit:    1. Condyloma (Primary)    2. Dyspareunia in female    3. Chronic pelvic pain in female        Pt wished to move forward with TCA treatment today. Gave verbal consent.     Pt tolerated well.      Avoid scratching or scrubbing the area. Pat clean and dry. RTC if lesion persists 2 weeks after treatment.      Pt voices understanding and agreement with this plan of care.      I discussed her chronic pelvic pain and dyspareunia and discussed endometriosis diagnosis at length. Last pelvic US  was normal in February. I discussed that just because it is negative, that doesn't rule out endometriosis. Pt voices understanding. I discussed considering myfembree, if approved, but pt will need to use condoms consistently as birth control. Pt voices understanding. She does not want to have diagnostic lap unless absolutely necessary. She wants to think about myfembree and call and let me know if she is ready to pursue this. I gave a brochure about it and explained the MOA.

## 2024-11-20 NOTE — PROGRESS NOTES
"Subjective   Una Dubois is a 16 y.o. female.     History of Present Illness   Pt presents with her mother for concerns about BTB on Depo Provera and left sided pelvic pain. Mother is in tears because she states Una has been in so much pain. She started bleeding 2.5 weeks ago. She has had small clots, less than quarter size. She has severe cramping rated 8/10 that is worse at night and keeps the pt from sleeping. It also makes her nauseated, LLQ and midline tender to the touch.  Mother states she is in the fetal position at home during the night in tears. Her flow is \"moderate\" changing regular pad 3x per day. She has tried Midol for cramping with little improvement. The pt is on her 2nd injection and had BTB x 1 week on the first shot. We discussed at length that this can be very normal for the first 3-4 injections. Pt does not want to continue receiving injections. Her next injection would be due on Feb 3rd. Pt has never had an ultrasound, been sexually active or used tampons.     The following portions of the patient's history were reviewed and updated as appropriate: allergies, current medications, past family history, past medical history, past social history, past surgical history and problem list.    Review of Systems   Constitutional: Positive for fatigue. Negative for chills and fever.   Respiratory: Negative.    Cardiovascular: Negative.    Gastrointestinal: Negative.  Constipation: hx of constipation.   Endocrine: Negative.  Negative for cold intolerance and heat intolerance.   Genitourinary: Positive for menstrual problem and pelvic pain. Negative for vaginal discharge.   Neurological: Negative for dizziness, syncope, light-headedness and headaches.   Psychiatric/Behavioral: Positive for sleep disturbance. Negative for suicidal ideas. The patient is not nervous/anxious.        Objective   Physical Exam   Constitutional: She is oriented to person, place, and time. She appears well-developed and " Actually, the report does say he is due for colonoscopy in 5 years.  Please ask him if he would like another referral to Dr. Seaman   well-nourished.   Fatigued appearance, dark circles under her eyes   Cardiovascular: Normal rate, regular rhythm and normal heart sounds.    Pulmonary/Chest: Effort normal and breath sounds normal.   Abdominal: Soft. Bowel sounds are normal. There is tenderness in the suprapubic area and left lower quadrant. There is no rigidity and no guarding.   Genitourinary:   Genitourinary Comments: Exam deferred as pt has never been sexually active or used tampons   Neurological: She is alert and oriented to person, place, and time.   Skin: Skin is warm and dry. No pallor.   Psychiatric: She has a normal mood and affect. Her behavior is normal.   Vitals reviewed.      Assessment/Plan   Una was seen today for menstrual problem.    Diagnoses and all orders for this visit:    Pelvic pain  -     ibuprofen (ADVIL,MOTRIN) 600 MG tablet; Take 1 tablet by mouth Every 8 (Eight) Hours As Needed for mild pain (1-3) or moderate pain (4-6).  -     US Pelvis Complete; Future    Prolonged menstruation  -     US Pelvis Complete; Future       Pelvic ultrasound scheduled 1/19/17 at 8:00AM. Instructions given to pt and her mother. I will call mother with results.      Ibuprofen 600mg q 8 hrs prn for cramping; start early to get ahead of pain. Encourage exercise, multivitamin, and increased water intake. Home remedies include hot showers, heating pads, etc.    Pt is interested in the Xulane patch. She cannot remember to take a daily pill and is not interested in any procedural device like IUD or nexplanon, and is not comfortable with Nuva Ring process. We discussed that we could add a LoLoestrin dose after US if needed to try and stop the bleeding. I will determine this after US results. After this, she can RTC on Feb 3rd as scheduled for Depo Provera injection but instead we can discuss the patch process. Pt and mother verbalize agreement to plan of care.